# Patient Record
Sex: MALE | HISPANIC OR LATINO | URBAN - METROPOLITAN AREA
[De-identification: names, ages, dates, MRNs, and addresses within clinical notes are randomized per-mention and may not be internally consistent; named-entity substitution may affect disease eponyms.]

---

## 2017-07-26 ENCOUNTER — APPOINTMENT (RX ONLY)
Dept: URBAN - METROPOLITAN AREA CLINIC 288 | Facility: CLINIC | Age: 52
Setting detail: DERMATOLOGY
End: 2017-07-26

## 2017-07-26 DIAGNOSIS — D18.0 HEMANGIOMA: ICD-10-CM

## 2017-07-26 DIAGNOSIS — L81.4 OTHER MELANIN HYPERPIGMENTATION: ICD-10-CM

## 2017-07-26 DIAGNOSIS — L82.1 OTHER SEBORRHEIC KERATOSIS: ICD-10-CM

## 2017-07-26 DIAGNOSIS — B07.8 OTHER VIRAL WARTS: ICD-10-CM

## 2017-07-26 PROBLEM — D18.01 HEMANGIOMA OF SKIN AND SUBCUTANEOUS TISSUE: Status: ACTIVE | Noted: 2017-07-26

## 2017-07-26 PROBLEM — E78.5 HYPERLIPIDEMIA, UNSPECIFIED: Status: ACTIVE | Noted: 2017-07-26

## 2017-07-26 PROBLEM — I10 ESSENTIAL (PRIMARY) HYPERTENSION: Status: ACTIVE | Noted: 2017-07-26

## 2017-07-26 PROCEDURE — ? OTC SALICYLIC ACID COUNSELING

## 2017-07-26 PROCEDURE — 99203 OFFICE O/P NEW LOW 30 MIN: CPT | Mod: 25

## 2017-07-26 PROCEDURE — ? LIQUID NITROGEN

## 2017-07-26 PROCEDURE — ? COUNSELING

## 2017-07-26 PROCEDURE — 17110 DESTRUCTION B9 LES UP TO 14: CPT

## 2017-07-26 ASSESSMENT — LOCATION SIMPLE DESCRIPTION DERM
LOCATION SIMPLE: LOWER BACK
LOCATION SIMPLE: LEFT THIGH
LOCATION SIMPLE: ABDOMEN
LOCATION SIMPLE: INFERIOR FOREHEAD
LOCATION SIMPLE: LEFT UPPER ARM
LOCATION SIMPLE: RIGHT SHOULDER
LOCATION SIMPLE: RIGHT CHEEK
LOCATION SIMPLE: RIGHT SCALP
LOCATION SIMPLE: LEFT LOWER BACK
LOCATION SIMPLE: LEFT SHOULDER
LOCATION SIMPLE: RIGHT UPPER ARM
LOCATION SIMPLE: SCALP

## 2017-07-26 ASSESSMENT — LOCATION DETAILED DESCRIPTION DERM
LOCATION DETAILED: RIGHT POSTERIOR SHOULDER
LOCATION DETAILED: LEFT POSTERIOR SHOULDER
LOCATION DETAILED: EPIGASTRIC SKIN
LOCATION DETAILED: RIGHT PROXIMAL POSTERIOR UPPER ARM
LOCATION DETAILED: RIGHT MEDIAL FRONTAL SCALP
LOCATION DETAILED: RIGHT DISTAL POSTERIOR UPPER ARM
LOCATION DETAILED: LEFT INFERIOR MEDIAL MIDBACK
LOCATION DETAILED: SUPERIOR LUMBAR SPINE
LOCATION DETAILED: INFERIOR MID FOREHEAD
LOCATION DETAILED: RIGHT SUPERIOR PARIETAL SCALP
LOCATION DETAILED: LEFT PROXIMAL POSTERIOR UPPER ARM
LOCATION DETAILED: LEFT ANTERIOR PROXIMAL THIGH
LOCATION DETAILED: RIGHT SUPERIOR CENTRAL MALAR CHEEK
LOCATION DETAILED: LEFT DISTAL POSTERIOR UPPER ARM
LOCATION DETAILED: PERIUMBILICAL SKIN

## 2017-07-26 ASSESSMENT — LOCATION ZONE DERM
LOCATION ZONE: LEG
LOCATION ZONE: SCALP
LOCATION ZONE: TRUNK
LOCATION ZONE: ARM
LOCATION ZONE: FACE

## 2017-07-26 NOTE — PROCEDURE: LIQUID NITROGEN
Medical Necessity Information: It is in your best interest to select a reason for this procedure from the list below. All of these items fulfill various CMS LCD requirements except the new and changing color options.
Include Z78.9 (Other Specified Conditions Influencing Health Status) As An Associated Diagnosis?: No
Number Of Freeze-Thaw Cycles: 1 freeze-thaw cycle
Medical Necessity Clause: This procedure was medically necessary because the lesions that were treated were:
Consent: The patient's consent was obtained including but not limited to risks of crusting, scabbing, blistering, scarring, darker or lighter pigmentary change, recurrence, incomplete removal and infection.
Detail Level: Detailed
Render Post-Care Instructions In Note?: yes
Post-Care Instructions: I reviewed with the patient in detail post-care instructions. Patient is to wear sunprotection, and avoid picking at any of the treated lesions. Pt may apply Vaseline to crusted or scabbing areas.

## 2017-07-26 NOTE — PROCEDURE: MIPS QUALITY
Quality 431: Preventive Care And Screening: Unhealthy Alcohol Use - Screening: Patient screened for unhealthy alcohol use using a single question and scores less than 2 times per year
Quality 130: Documentation Of Current Medications In The Medical Record: Current Medications Documented
Quality 128: Preventive Care And Screening: Body Mass Index (Bmi) Screening And Follow-Up Plan: BMI is documented within normal parameters and no follow-up plan is required.
Detail Level: Detailed
Quality 226: Preventive Care And Screening: Tobacco Use: Screening And Cessation Intervention: Patient screened for tobacco and never smoked

## 2019-06-10 ENCOUNTER — APPOINTMENT (RX ONLY)
Dept: URBAN - METROPOLITAN AREA CLINIC 304 | Facility: CLINIC | Age: 54
Setting detail: DERMATOLOGY
End: 2019-06-10

## 2019-06-10 DIAGNOSIS — L82.0 INFLAMED SEBORRHEIC KERATOSIS: ICD-10-CM

## 2019-06-10 DIAGNOSIS — B07.8 OTHER VIRAL WARTS: ICD-10-CM

## 2019-06-10 PROBLEM — E78.5 HYPERLIPIDEMIA, UNSPECIFIED: Status: ACTIVE | Noted: 2019-06-10

## 2019-06-10 PROBLEM — I10 ESSENTIAL (PRIMARY) HYPERTENSION: Status: ACTIVE | Noted: 2019-06-10

## 2019-06-10 PROBLEM — L70.0 ACNE VULGARIS: Status: ACTIVE | Noted: 2019-06-10

## 2019-06-10 PROCEDURE — ? LIQUID NITROGEN

## 2019-06-10 PROCEDURE — ? BENIGN DESTRUCTION

## 2019-06-10 PROCEDURE — 17110 DESTRUCTION B9 LES UP TO 14: CPT

## 2019-06-10 ASSESSMENT — LOCATION DETAILED DESCRIPTION DERM
LOCATION DETAILED: LEFT SUPERIOR CENTRAL MALAR CHEEK
LOCATION DETAILED: RIGHT SUPERIOR CENTRAL MALAR CHEEK
LOCATION DETAILED: SUPERIOR MID FOREHEAD

## 2019-06-10 ASSESSMENT — LOCATION SIMPLE DESCRIPTION DERM
LOCATION SIMPLE: RIGHT CHEEK
LOCATION SIMPLE: LEFT CHEEK
LOCATION SIMPLE: SUPERIOR FOREHEAD

## 2019-06-10 ASSESSMENT — LOCATION ZONE DERM: LOCATION ZONE: FACE

## 2019-06-10 NOTE — PROCEDURE: LIQUID NITROGEN
Consent: The patient's consent was obtained including but not limited to risks of crusting, scabbing, blistering, scarring, darker or lighter pigmentary change, recurrence, incomplete removal and infection.
Render Post-Care Instructions In Note?: no
Medical Necessity Information: It is in your best interest to select a reason for this procedure from the list below. All of these items fulfill various CMS LCD requirements except the new and changing color options.
Medical Necessity Clause: This procedure was medically necessary because the lesions that were treated were:
Detail Level: Detailed
Post-Care Instructions: I reviewed with the patient in detail post-care instructions. Patient is to wear sunprotection, and avoid picking at any of the treated lesions. Pt may apply Vaseline to crusted or scabbing areas.

## 2019-06-10 NOTE — PROCEDURE: BENIGN DESTRUCTION
Medical Necessity Information: It is in your best interest to select a reason for this procedure from the list below. All of these items fulfill various CMS LCD requirements except the new and changing color options.
Medical Necessity Clause: This procedure was medically necessary because the lesions that were treated were:
Detail Level: Detailed
Render Note In Bullet Format When Appropriate: No
Treatment Number (Will Not Render If 0): 0
Anesthesia Volume In Cc: 0.5
Consent: The patient's consent was obtained including but not limited to risks of crusting, scabbing, blistering, scarring, darker or lighter pigmentary change, recurrence, incomplete removal and infection.
Post-Care Instructions: I reviewed with the patient in detail post-care instructions. Patient is to wear sunprotection, and avoid picking at any of the treated lesions. Pt may apply Vaseline to crusted or scabbing areas.

## 2023-08-22 ENCOUNTER — OFFICE VISIT (OUTPATIENT)
Dept: INTERNAL MEDICINE | Facility: CLINIC | Age: 58
End: 2023-08-22
Payer: COMMERCIAL

## 2023-08-22 VITALS
SYSTOLIC BLOOD PRESSURE: 128 MMHG | TEMPERATURE: 96.8 F | OXYGEN SATURATION: 97 % | DIASTOLIC BLOOD PRESSURE: 94 MMHG | WEIGHT: 190.4 LBS | HEART RATE: 54 BPM

## 2023-08-22 DIAGNOSIS — Z12.5 SCREENING PSA (PROSTATE SPECIFIC ANTIGEN): ICD-10-CM

## 2023-08-22 DIAGNOSIS — Z00.00 ANNUAL PHYSICAL EXAM: ICD-10-CM

## 2023-08-22 DIAGNOSIS — Z23 NEED FOR TDAP VACCINATION: ICD-10-CM

## 2023-08-22 DIAGNOSIS — I10 PRIMARY HYPERTENSION: Primary | ICD-10-CM

## 2023-08-22 DIAGNOSIS — E78.5 HYPERLIPIDEMIA, UNSPECIFIED HYPERLIPIDEMIA TYPE: ICD-10-CM

## 2023-08-22 PROCEDURE — 99204 OFFICE O/P NEW MOD 45 MIN: CPT | Performed by: PHYSICIAN ASSISTANT

## 2023-08-22 RX ORDER — LISINOPRIL 10 MG/1
10 TABLET ORAL DAILY
COMMUNITY
End: 2023-08-22

## 2023-08-22 RX ORDER — LISINOPRIL 20 MG/1
20 TABLET ORAL DAILY
Qty: 90 TABLET | Refills: 1 | Status: SHIPPED | OUTPATIENT
Start: 2023-08-22

## 2023-08-22 RX ORDER — ATORVASTATIN CALCIUM 20 MG/1
40 TABLET, FILM COATED ORAL
COMMUNITY
Start: 2020-08-15

## 2023-08-22 NOTE — PROGRESS NOTES
[facility]  [address]  [place] [address]  Dept: [phone]  Dept Fax: [phone]  Loc: [phone]  Loc Fax: [phone]    Jai Lopez  1965    New Patient Office Note    History of Present Illness:  Patient is a 58-year-old male in today to establish care for high blood pressure and hyperlipidemia.  On Lipitor 20 mg daily for high cholesterol.  Taking as directed with any problems or side effects.  Needing FLP rechecked.    Patient on lisinopril 10 mg daily as directed with any problems or side effects from blood pressure.  Patient reports blood pressure higher at home.  Denies any chest pain or headache.    Also needing annual physical.      The following portions of the patient's history were reviewed and updated as appropriate: allergies, current medications, past family history, past medical history, past social history, past surgical history, and problem list.    Medications:    Current Outpatient Medications:     atorvastatin (LIPITOR) 20 MG tablet, 2 tablets., Disp: , Rfl:     lisinopril (PRINIVIL,ZESTRIL) 20 MG tablet, Take 1 tablet by mouth Daily., Disp: 90 tablet, Rfl: 1    Subjective  No Known Allergies     Past Medical History:   Diagnosis Date    High cholesterol     Hypertension        Past Surgical History:   Procedure Laterality Date    SHOULDER SURGERY  2000       Family History   Problem Relation Age of Onset    Hyperlipidemia Mother     Hypertension Mother     Hyperlipidemia Brother     Hypertension Brother         Social History     Socioeconomic History    Marital status:    Tobacco Use    Smoking status: Never    Smokeless tobacco: Never   Vaping Use    Vaping Use: Never used   Substance and Sexual Activity    Alcohol use: Yes     Alcohol/week: 4.0 standard drinks     Types: 4 Drinks containing 0.5 oz of alcohol per week    Drug use: Never    Sexual activity: Defer       Review of Systems   Constitutional:   Negative for activity change, chills, fatigue, fever and unexpected weight change.   HENT:  Negative for congestion, ear pain, postnasal drip, sinus pressure and sore throat.    Eyes:  Negative for pain, discharge and redness.   Respiratory:  Negative for cough, shortness of breath and wheezing.    Cardiovascular:  Negative for chest pain, palpitations and leg swelling.   Gastrointestinal:  Negative for diarrhea, nausea and vomiting.   Endocrine: Negative for cold intolerance and heat intolerance.   Genitourinary:  Negative for decreased urine volume and dysuria.   Musculoskeletal:  Negative for arthralgias and myalgias.   Skin:  Negative for rash and wound.   Neurological:  Negative for dizziness, light-headedness and headaches.   Hematological:  Does not bruise/bleed easily.   Psychiatric/Behavioral:  Negative for confusion, dysphoric mood and sleep disturbance. The patient is not nervous/anxious.      Objective  Vitals:    08/22/23 1029   BP: 128/94   BP Location: Left arm   Patient Position: Sitting   Cuff Size: Large Adult   Pulse: 54   Temp: 96.8 øF (36 øC)   TempSrc: Temporal   SpO2: 97%   Weight: 86.4 kg (190 lb 6.4 oz)       Physical Exam  Physical Exam  Vitals and nursing note reviewed.   Constitutional:       General: He is not in acute distress.     Appearance: He is not ill-appearing.   HENT:      Head: Normocephalic.      Right Ear: Tympanic membrane, ear canal and external ear normal. There is no impacted cerumen.      Left Ear: Tympanic membrane, ear canal and external ear normal. There is no impacted cerumen.      Nose: No congestion or rhinorrhea.      Mouth/Throat:      Mouth: Mucous membranes are moist.      Pharynx: Oropharynx is clear. No oropharyngeal exudate or posterior oropharyngeal erythema.   Eyes:      General:         Right eye: No discharge.         Left eye: No discharge.      Extraocular Movements: Extraocular movements intact.      Conjunctiva/sclera: Conjunctivae normal.       Pupils: Pupils are equal, round, and reactive to light.   Cardiovascular:      Rate and Rhythm: Normal rate and regular rhythm.      Heart sounds: Normal heart sounds. No murmur heard.    No friction rub. No gallop.   Pulmonary:      Effort: Pulmonary effort is normal. No respiratory distress.      Breath sounds: Normal breath sounds. No wheezing.   Abdominal:      General: Bowel sounds are normal. There is no distension.      Palpations: Abdomen is soft. There is no mass.      Tenderness: There is no abdominal tenderness.   Musculoskeletal:         General: No swelling. Normal range of motion.      Cervical back: Normal range of motion. No tenderness.      Right lower leg: No edema.      Left lower leg: No edema.   Lymphadenopathy:      Cervical: No cervical adenopathy.   Skin:     Findings: No bruising, erythema or rash.   Neurological:      Mental Status: He is oriented to person, place, and time.      Gait: Gait normal.   Psychiatric:         Mood and Affect: Mood normal.         Behavior: Behavior normal.         Thought Content: Thought content normal.         Judgment: Judgment normal.       Diagnostic Data  Procedures    Assessment  Diagnoses and all orders for this visit:    1. Primary hypertension (Primary)    2. Hyperlipidemia, unspecified hyperlipidemia type    3. Need for Tdap vaccination    4. Annual physical exam  -     CBC (No Diff)  -     Comprehensive Metabolic Panel  -     TSH Rfx On Abnormal To Free T4  -     Hepatitis C Antibody  -     Lipid Panel  -     Hemoglobin A1c; Future    5. Screening PSA (prostate specific antigen)  -     PSA SCREENING; Future    Other orders  -     lisinopril (PRINIVIL,ZESTRIL) 20 MG tablet; Take 1 tablet by mouth Daily.  Dispense: 90 tablet; Refill: 1        Plan    1. Primary hypertension (Primary)- uncontrolled on lisinopril 10 mg daily.  Increase to 20 mg daily. Advised patient to take blood pressure readings at home 2-3 times daily. Advised if blood pressure  higher than 160/100 or lower than 100/60 to call the office immediately. If symptomatic, go to the ER. Patient verbalized understanding of all instructions given and complied.    2. Hyperlipidemia, unspecified hyperlipidemia type- on atorvastatin 20 mg nightly.  Repeat FLP.    3. Need for Tdap vaccination- will get follow-up.    4. Annual physical exam- obtain fasting labs and follow-up with these.  Advised on nutrition and exercise and follow-up with this.    5. Screening PSA (prostate specific antigen)- ordered PSA.      Return in about 2 weeks (around 9/5/2023) for Recheck.    Lucien Mathews PA-C  08/22/2023

## 2023-08-23 ENCOUNTER — LAB (OUTPATIENT)
Dept: INTERNAL MEDICINE | Facility: CLINIC | Age: 58
End: 2023-08-23
Payer: COMMERCIAL

## 2023-08-23 DIAGNOSIS — Z00.00 ANNUAL PHYSICAL EXAM: ICD-10-CM

## 2023-08-23 DIAGNOSIS — Z12.5 SCREENING PSA (PROSTATE SPECIFIC ANTIGEN): ICD-10-CM

## 2023-08-23 LAB
ALBUMIN SERPL-MCNC: 4.3 G/DL (ref 3.5–5.2)
ALBUMIN/GLOB SERPL: 1.5 G/DL
ALP SERPL-CCNC: 56 U/L (ref 39–117)
ALT SERPL W P-5'-P-CCNC: 29 U/L (ref 1–41)
ANION GAP SERPL CALCULATED.3IONS-SCNC: 12.4 MMOL/L (ref 5–15)
AST SERPL-CCNC: 27 U/L (ref 1–40)
BILIRUB SERPL-MCNC: 0.4 MG/DL (ref 0–1.2)
BUN SERPL-MCNC: 14 MG/DL (ref 6–20)
BUN/CREAT SERPL: 13.3 (ref 7–25)
CALCIUM SPEC-SCNC: 9.7 MG/DL (ref 8.6–10.5)
CHLORIDE SERPL-SCNC: 105 MMOL/L (ref 98–107)
CHOLEST SERPL-MCNC: 134 MG/DL (ref 0–200)
CO2 SERPL-SCNC: 21.6 MMOL/L (ref 22–29)
CREAT SERPL-MCNC: 1.05 MG/DL (ref 0.76–1.27)
DEPRECATED RDW RBC AUTO: 42.6 FL (ref 37–54)
EGFRCR SERPLBLD CKD-EPI 2021: 82.8 ML/MIN/1.73
ERYTHROCYTE [DISTWIDTH] IN BLOOD BY AUTOMATED COUNT: 14.3 % (ref 12.3–15.4)
GLOBULIN UR ELPH-MCNC: 2.8 GM/DL
GLUCOSE SERPL-MCNC: 96 MG/DL (ref 65–99)
HBA1C MFR BLD: 6.3 % (ref 4.8–5.6)
HCT VFR BLD AUTO: 44.8 % (ref 37.5–51)
HCV AB SER DONR QL: NORMAL
HDLC SERPL-MCNC: 34 MG/DL (ref 40–60)
HGB BLD-MCNC: 15.3 G/DL (ref 13–17.7)
LDLC SERPL CALC-MCNC: 69 MG/DL (ref 0–100)
LDLC/HDLC SERPL: 1.87 {RATIO}
MCH RBC QN AUTO: 28.8 PG (ref 26.6–33)
MCHC RBC AUTO-ENTMCNC: 34.2 G/DL (ref 31.5–35.7)
MCV RBC AUTO: 84.2 FL (ref 79–97)
PLATELET # BLD AUTO: 206 10*3/MM3 (ref 140–450)
PMV BLD AUTO: 10.7 FL (ref 6–12)
POTASSIUM SERPL-SCNC: 4.3 MMOL/L (ref 3.5–5.2)
PROT SERPL-MCNC: 7.1 G/DL (ref 6–8.5)
PSA SERPL-MCNC: 1.98 NG/ML (ref 0–4)
RBC # BLD AUTO: 5.32 10*6/MM3 (ref 4.14–5.8)
SODIUM SERPL-SCNC: 139 MMOL/L (ref 136–145)
TRIGL SERPL-MCNC: 182 MG/DL (ref 0–150)
TSH SERPL DL<=0.05 MIU/L-ACNC: 2.27 UIU/ML (ref 0.27–4.2)
VLDLC SERPL-MCNC: 31 MG/DL (ref 5–40)
WBC NRBC COR # BLD: 6.01 10*3/MM3 (ref 3.4–10.8)

## 2023-08-23 PROCEDURE — 84443 ASSAY THYROID STIM HORMONE: CPT | Performed by: PHYSICIAN ASSISTANT

## 2023-08-23 PROCEDURE — 80053 COMPREHEN METABOLIC PANEL: CPT | Performed by: PHYSICIAN ASSISTANT

## 2023-08-23 PROCEDURE — 36415 COLL VENOUS BLD VENIPUNCTURE: CPT | Performed by: PHYSICIAN ASSISTANT

## 2023-08-23 PROCEDURE — 86803 HEPATITIS C AB TEST: CPT | Performed by: PHYSICIAN ASSISTANT

## 2023-08-23 PROCEDURE — 83036 HEMOGLOBIN GLYCOSYLATED A1C: CPT | Performed by: PHYSICIAN ASSISTANT

## 2023-08-23 PROCEDURE — 85027 COMPLETE CBC AUTOMATED: CPT | Performed by: PHYSICIAN ASSISTANT

## 2023-08-23 PROCEDURE — G0103 PSA SCREENING: HCPCS | Performed by: PHYSICIAN ASSISTANT

## 2023-08-23 PROCEDURE — 80061 LIPID PANEL: CPT | Performed by: PHYSICIAN ASSISTANT

## 2023-09-02 ENCOUNTER — E-VISIT (OUTPATIENT)
Dept: FAMILY MEDICINE CLINIC | Facility: TELEHEALTH | Age: 58
End: 2023-09-02

## 2023-09-02 NOTE — E-VISIT TREATED
Chief Complaint: Rashes and other skin conditions   Patient introduction   Patient is 58-year-old male presenting with nonpainful, nonpruritic rash on their legs for 24 to 48 hours.   General presentation   Patient has not had any recent cold symptoms. No fever, chills, myalgia, nausea, vomiting, diarrhea, headache, or fatigue/lethargy.   Has not tried any treatments for current rash symptoms.   Patient has no previous history of a similar rash.   Eczema: Patient has history and family history of atopic conditions.   Insect bites: No known recent insect exposure.   Chickenpox: Has had chickenpox.   Scabies: No recent scabies exposure.   Impetigo: No recent impetigo exposure.   Ticks: Patient has not recently spent significant time outdoors. In previous month, patient has not spent any time in regions with a high risk of tick-borne disease.   Appearance or location of rash does not change throughout the course of a day.   Rash has not spread to a new location.   No herald patch prior to onset of rash.   Rash is not in an area that is regularly shaved. Patient does not participate in contact sports. Patient does not work in a school or childcare facility.   No history of prior MRSA infection.   No known aggravators.   Review of red flags/alarm symptoms:    No systemic symptoms    No symptoms of anaphylactic reaction    No swollen lymph nodes    No recent history suggesting adverse drug rash (no new medication, herb, or supplement)   Current medications   Currently taking lisinopril 20 MG tablet and atorvastatin 20 MG tablet.   Medication allergies   None.   Medication contraindication review   Patient is currently taking an ACE inhibitor. Therefore, medications containing trimethoprim will not be prescribed.   No cerebral malaria, CHF, cutaneous wfllv-oauteo-fefo disease, folate deficiency, G6PD deficiency (or breastfeeding a child with G6PD deficiency), generalized erythroderma, liver disease, lamellar ichthyosis,  malignancy or premalignancy at the affected site, megaloblastic anemia, mononucleosis, Netherton syndrome, peripheral neuropathy, porphyria, QT prolongation, congenital long QT syndrome, skin barrier defect condition, systemic mycoses, TMP/SMX-associated thrombocytopenia, thyroid dysfunction, or ulcerative colitis.   No history of myasthenia gravis, aortic aneurysm or dissection, Marfan syndrome, or Caden-Danlos syndrome.   Past medical history   Immune conditions: No immunocompromising conditions.   No history of cancer.   Patient-submitted comments   Patient was asked if they had anything to add about their symptoms. Patient writes: Dots showed somewhere in last two days. Just moved to [place] a month ago. Lots of mosquitoes here. Also just doubled linisopril from10 to 20 mg. .   Patient did not request an excuse note.   Assessment   Atopic dermatitis (Eczema).   This is the likely diagnosis based on interview responses and patient-submitted photos, including:    Symptom profile    Personal history of atopic conditions    Family history of atopic conditions   Plan   Medications:    triamcinolone acetonide 0.5 % topical cream RX 0.5% apply thin film on affected area bid 14d Use for up to 14 days. Discontinue sooner if symptoms clear up completely. Amount is 15 g.   The patient's prescription will be sent to:   [id number]   [address] [facility] [id number]   Phone: [phone]     Fax: [phone]   Education:    Condition and causes    Prevention    Treatment and self-care    When to call provider   ----------   Electronically signed by TIMOTHY Hurt on 2023-09-02 at 18:08PM   ----------   Patient Interview Transcript:   Where is the affected area located? Select all that apply.    Leg   Not selected:    Scalp    Face    Inside mouth or on lips    Neck    Arm    Hand    Chest    Stomach    Back    Buttocks    Groin    Foot or in between toes    None of the above   Which leg is bothering  you? Select one.    Both   Not selected:    Right    Left   Before your skin symptoms appeared, were you exposed to any of these possible triggers? Select all that apply.    None of the above   Not selected:    Tick bite    Other insect bite or sting in the affected area    Dog or cat bite    Cut, scrape, or other skin injury in the affected area    Contact with poison oak, poison ivy, or poison sumac in the affected area    Contact with a new soap, perfume, skincare product, cleaning product, or other chemical in the affected area    Wearing new jewelry, belt buckle, or other metal accessory in the affected area    Taking a new medication, supplement, or herb    Consuming a new food or drink    Vaccine injection    Exposure to extreme hot or cold temperatures    Exercising intensely    Sitting in a hot tub or swimming in a heated swimming pool    Wearing ill-fitting shoes or socks for a long time    Contact with someone who has a similar rash    Contact with someone who has impetigo    Contact with someone who has scabies    Other (specify)   Have you ever had chickenpox? Select one.    Yes   Not selected:    No    I'm not sure   Is the affected skin in an area that you shave regularly? Select one.    No   Not selected:    Yes   Does the appearance or location of your skin symptoms change throughout the course of a day? For example, does it appear on one part of your body in the morning, disappear completely after several hours, and then reappear on a different part of your body? Select one.    No   Not selected:    Yes   Since your skin symptoms first appeared, have they spread or shown up in new locations? This includes covering a larger area than they first did, or spreading to another part of the body. Select one.    No   Not selected:    Yes   Which of these describe the affected area? Select all that apply.    None of the above   Not selected:    Itchy    Painful    Warmer than other areas of my skin   How  long have you had these current skin symptoms? Select one.    24 to 48 hours   Not selected:    Less than 24 hours    2 to 3 days    3 to 5 days    5 to 7 days    1 to 2 weeks    More than 2 weeks   Do any of these make your symptoms worse? Select all that apply.    None of the above   Not selected:    Alcohol    Exercise    Exposure to very hot or very cold temperature    Certain foods    Changes in weather    , soaps, or detergents    Hot beverages    Spicy foods    Stress or strong emotions (such as anger or embarrassment)    Sun exposure    Sweat    Wool in clothing or blankets   To recommend the best treatment for you, we need to see photos of the affected area.   [image: /[email or web address]-[email or web address]]   Close-up detail (for size, shape, and color)   [image: /[email or web address]-rash-location.[email or web address]]   Surrounding area (to compare with healthy skin)   [image: /[email or web address]-rash-distance.png]   Affected area at a distance (for scale and location)   If you choose not to send photos, you'll need to speak with a provider to get care.    Select one.    OK, I'll send photos.   Not selected:    I'd rather not send photos. Show me my care options.   Send at least 3 photos for review - Don't use a flash. - Make sure the photos are in focus. - Take a close-up photo (for size, shape, color). - Take a photo showing surrounding area (to compare with healthy skin). - Take a photo with a quarter coin or ruler near the affected area to show scale. - If more than one location is affected, repeat for each location.    Upload 1    Upload 2    Upload 3   Not selected:    Upload 4    Upload 5   A rash can be a sign of a more serious condition. These conditions may need in-person care for an exam or lab tests. Along with your skin symptoms, have you had any of these symptoms? Select all that apply.    None of these   Not selected:    Fever    Chills    Body aches or muscle aches    Nausea    Vomiting    Diarrhea     Headache    Fatigue, exhaustion, or lethargy (feeling drowsy, dull, or low energy)   Have you had swollen lymph nodes? Swollen lymph nodes are small lumps under the skin that are soft, tender, and often painful. They may be noticed in the neck, the armpits, or the groin. Select one.    No, not that I've noticed   Not selected:    Yes   Along with your skin symptoms, have you had any of these symptoms? Select all that apply.    None of these   Not selected:    Chest pains or rapid heartbeat    Shortness of breath or wheezing    Swelling of lips or tongue    Throat tightness or hoarse voice    Stomach cramps, diarrhea, or vomiting    Confusion or dizziness   Have you recently had any cold symptoms (runny nose, nasal congestion, cough, or sore throat)? Select one.    No   Not selected:    Yes   Before the rash appeared, did you see a large, round patch on your chest, stomach, or back? This patch is usually 1 to 4 inches across, dry, and itchy. It often appears a few days to a few weeks before the rash. Select one.    No   Not selected:    Yes   Were you recently exposed to any insects? For example: - Do you have any household pets that may have fleas? - Have you noticed an increase in spiders, either indoors or outdoors? - Have you slept where bedbugs may be present? - Have you hiked, camped, or gardened where mosquitoes may have been present?    No, not that I know of   Not selected:    Yes   In the last month, did you spend a lot of time outdoors, especially in wooded areas with brushy fields or tall grasses? Select one.    No   Not selected:    Yes   In the last month, have you been to any of these states? Scroll to see all options. Select all that apply.    No   Not selected:    [facility]    [facility]    [facility]   Have you had  these skin symptoms before? Select one.    No   Not selected:    At least once before    Many times before    I'm not sure   Do you or does anyone in your family have a history of allergies (hay fever, seasonal allergies), eczema, or asthma? Select all that apply.    Yes, I do    Yes, a family member does   Not selected:    No, not that I know of   Have you ever been treated for a MRSA (methicillin-resistant Staphylococcus aureus) infection? MRSA is a type of bacteria that's resistant to many commonly used antibiotics. Select one.    No, not that I know of   Not selected:    Yes   Do you have any of these conditions? Scroll to see all options. Select all that apply.    None of the above   Not selected:    Cerebral malaria    Congenital long QT syndrome    Folate deficiency    Systemic fungal infection, such as invasive candidiasis    G6PD deficiency, or breastfeeding a child with G6PD deficiency    Infection at the affected area    Megaloblastic anemia    Mono (mononucleosis)    Decreased sensation in feet (peripheral neuropathy)    Porphyria    Skin cancer or pre-malignancy at the affected area    A serious skin condition (skin barrier defect condition, Netherton syndrome, lamellar ichthyosis, generalized erythroderma, or cutaneous ezjrj-mdocib-bxwv disease)    QT prolongation    Thyroid dysfunction    Ulcerative colitis   Do you have any of these conditions that can affect the immune system? Scroll to see all options. Select all that apply.    None of these   Not selected:    History of bone marrow transplant    Chronic kidney disease    Chronic liver disease (including cirrhosis)    HIV/AIDS    Inflammatory bowel disease (Crohn's disease or ulcerative colitis)    Lupus    Moderate to severe plaque psoriasis    Multiple sclerosis    Rheumatoid arthritis    Sickle cell anemia    Alpha or beta thalassemia    History of solid organ transplant (kidney, liver, or heart)    History of spleen removal    An autoimmune  disorder not listed here (specify)    A condition requiring treatment with long-term use of oral steroids (such as prednisone, prednisolone, or dexamethasone) (specify)   Have you ever been diagnosed with cancer? Select one.    No   Not selected:    Yes, I have cancer now    Yes, but I'm in remission   Do you have any of these conditions? Scroll to see all options. Select all that apply.    None of the above   Not selected:    Myasthenia gravis    History of aortic aneurysm or dissection    Marfan syndrome    Caden-Danlos syndrome   Are you currently being treated for type 1 or type 2 diabetes? Select one.    No   Not selected:    Yes   Do you play sports or take part in activities with skin-to-skin contact? Select one.    No   Not selected:    Yes   Do you work in a school or childcare center? Select one.    No   Not selected:    Yes   Have you tried any treatments for your current symptoms? Select one.    No   Not selected:    Yes   Are you taking any of these medications? Select all that apply.    An ACE inhibitor, such as lisinopril, enalapril, captopril, or benazepril   Not selected:    An angiotensin II receptor blocker (ARB), such as candesartan, irbesartan, losartan, or valsartan    Kynmobi or Apokyn (apomorphine)    No   Are you still taking these medications listed in your medical record? If you're not taking any of these, click Next. Select all that apply.    lisinopril 20 MG tablet    atorvastatin 20 MG tablet   Are you taking any other medications, vitamins, or supplements? Select one.    No   Not selected:    Yes   Have you ever had an allergic or bad reaction to any medication? Select one.    No   Not selected:    Yes   Have you ever had jaundice or liver problems as a result of taking amoxicillin-clavulanate (Augmentin)? Jaundice is a condition in which the skin and the whites of the eyes turn yellow. Select all that apply.    No, not that I know of   Not selected:    Yes, jaundice    Yes, liver  problems   Do you need a doctor's note? A doctor's note confirms that you received care today and states when you can return to school or work. It does not contain information about your diagnosis or treatment plan. Your provider will make the final decision on whether to give you a doctor's note. Doctor's notes cannot be backdated. Select one.    No   Not selected:    Today only (1 day)    Today and tomorrow (2 days)    3 days   Is there anything you'd like to add about your symptoms? Please limit your comments to the symptoms asked about in this interview. If you include comments about other concerns, your provider may recommend that you be seen in person.    Dots showed somewhere in last two days. Just moved to [place] a month ago. Lots of mosquitoes here. Also just doubled linisopril from10 to 20 mg.   ----------   Medical history   Medical history data does not currently exist for this patient.

## 2023-09-02 NOTE — EXTERNAL PATIENT INSTRUCTIONS
[image:  data:image/svg+xml;base64,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 HBNcBL78RIPeEIS6GyD8GzrpVLLjPlE3BBDsITBxVgPnSZBsLrG4Cn67N9OcsXo+GboeQGcuDZMaFNffQOSzi8BuNxPzdWFose9kw5Itz1MsNFJ0oy0qjQDeb5AasYkurKEgWQO7Rv7yXU79SEK1DPDoJqDwBhweAF55JOCoFQJcYkEgMA3aLBLtGWSrLi59P1KdfAd+AnpaJYfsCZZwLUjfEYFgTHbzXmAduOQpjp2ow8Zgj3MkHTK8fz1maBfvULfykXBoXKY5wh8bVJ47pFGgUKBlMW3iAIEfFBK5LhRtRQGzSSZkWP2rYnDaXU96SSThQUMxGFBuXw16TDI5AIN9VTI1PkPxDUdqMwRmSNFcRL4mCcJyQB37LJCvZeT7KN95SUZbQZD7AhOxZLOtAfP9Kh95LpErOK12DggwUY6fPYYxRyskXF90HfDpFxnaYAwwGQUzTx38OYR5FMUbDKL0nzR+GA1sSBDqYdb0T4L1Tg0=]   Diagnosis   Eczema   My name is TIMOTHY Hurt, and I'm a healthcare provider at [facility]. After reviewing your responses and photos, I see that you have eczema. This is a common condition that affects millions of people every year.   [image:  data:image/svg+xml;base64,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]   Medications   Your pharmacy   [id number] [facility] [phone]     Prescription   Triamcinolone  acetonide topical cream (0.5%): Apply thin film on affected area twice a day for up to 14 days. Discontinue sooner if symptoms clear up completely.   [image: data:image/svg+xml;base64,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]   About your diagnosis   Eczema, also known as atopic dermatitis, is a condition that causes dry, itchy, scaly skin. In people with light skin, areas with eczema might look red or pink. In people with dark skin, affected areas can appear  dark brown, purple, or gray. Sometimes the affected patch of skin looks lighter than the skin around it.   The dry and itchy skin of eczema gets worse with scratching. Scratching can lead to swelling, small bumps, and even oozing blisters that eventually crust over and turn scaly. Environmental irritants and allergens can trigger eczema. Pollen, mold, animal dander, and certain foods are all common triggers.   Eczema is most often seen in younger children, but adults can also get it. It tends to run in families, which seems to be true in your case. Eczema isn't contagious.   I looked at your interview responses carefully to diagnose your condition. You told me that:    The rash is located on the buttocks, arm, or leg. Eczema is often found in the creases of elbows, on the backs of knees, and on the buttocks.   [image:  data:image/svg+xml;base64,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]   What to  expect   Most patients have occasional flare-ups of their eczema. Your skin may stay healed for weeks, months, or even years before symptoms return. Although eczema can't be cured, the symptoms can be managed.   [image: data:image/svg+xml;base64,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]   When to seek care   Call us at [phone]   with any sudden or unexpected symptoms.    Your symptoms don't improve within 1 week of treatment.    Signs of a skin infection, which may include fever or the rash becoming painful, very red, or warm.    You develop painful, fluid-filled blisters.   If you have contact with someone who has a viral skin infection, such as herpes or cold sores, contact us to schedule an appointment as soon as possible.   [image:  data:image/svg+xml;base64,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]   Other treatment    Moisturize your skin often, especially within a few minutes after taking a shower or bath. This can help restore your skin's natural barrier and keep it healthy. Fragrance-free creams and ointments (Cerave Cream, Cetaphil Restoraderm, or Eucerin Eczema Relief body creme) are better than lotions.    Trim your fingernails and try to avoid scratching. Scratching can lead to a skin infection and scarring.    Wear loose cotton clothing to avoid further irritating your skin.   [image:  data:image/svg+xml;base64,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]   Prevention    Avoid hot or very long showers or baths.    Wash your skin with mild or gentle soaps.    Try to manage stress. Exercise regularly and set aside time to relax.    Use a humidifier to prevent the air from becoming too dry and making your eczema worse.   [image:  data:[email or web address]+xml;base64,SZN4GgE8wHeseg0tkSZ0wArvN9m9cn94Sm9pweaoIjVjOT8bhtdjNPKbiuLod658IeOjPBSvxEQ4Gdnyw28gIXNwLSBhhtBkD3ApZUp9FpGnAVe2NzNuYJcsJLEsNUJ9ORSnjUMoN0v8CIU5GYGegkjkb3SjhG5jKQJbDSG7TFA7DnT4xNs3c7MsD5S4WjNoAERjwnVbRqMlcjzvBYrzMCEisr6vnNV6FPQ+MqMpABShmFouzMR0EeGyzsLiIWCzWMReQYJfKW4idwxbJEQrHCAcwQtct7gdpYGpy7Xua6tiVJKoPU9aRnIeXZDWNRIoDTmWEEWdFUAuJeFxKMPkGQ15TJNmPDNfPlMeWTIzFuVkCyktUTG6SXLzWMReMq5yHWSkBODhScglJUB3hpNaYqP1BTG0SkFpWDGtRhJ4ZPR1VvH0ZTkjHyrtbMH4nK6GQhFnULpyqqWcuWH6HuqpfjBxJoHcgZFxvb9ik2Aep2EuBML2jy7wgZPqr3WioJnaqQAlKShkDSMgCwEijZG3LsQnAzX4Aj0xPATvHHopHSB7LVI+FJ4bmW0gIvsZTUA3sVdrMFTwJO7olGsvLYFjOKThDPPbUZXbUG1knresESSmNJU6rm2dOL26qSVeYCIwfRZ2KhEnRiR7RP2gZkVgMNnbQPJ7XZJjzVQ2SdG7Un96L7rxkqF+NmscHYvnxU4kOJxmRBTxcF9tNTArW8vef0V2EaJoBYM0Mn2fnkZyd0Maa8kuPVTiBM7bJcU9XK2uOyLrETlpRZOgECZfvXH5MjVlAiT8Cj2jCvopIkxvkRhtIQ5VEbEnXSBltNobqHN0BlHtSPtyLN5dVDAwPGWzHUNwFR8bqfruQDSsNYIuaYevi9ytkQQzf8Glg2yyAQDwRC3hEhQrTSVDGvTtQnAcKt31bR19YbWcSSBeWlYxDYQfFFMdBeMvPy38WMPjWNN0TxShXVLVAhTNKhWxUwJaWiAlYJ26RXAxUyNyNzLsXoIsVr79kiD+BS9jEEIuIriAPKW7jQJ6mKYfSF7ryWDxIG4qHxMmU1cwa7Z2EyJeDUF6Fy6cwlBjVwfyvZ76lDw0BR0udLZpf2NlkXscqLHaAQH5Nn4hXX8eQUE9YhEaXHJxPyJtAL81DPDrWU7qMWMxDQQfON8qNpFlTW1wXERQPFhyMeYsELWuHWWhMDViFX1wYNS5XYM0PwJxXlysOJvnQssurXL1bS9KHU5tviz+]   Your provider   Your diagnosis was provided by TIMOTHY Hurt, a member of your trusted care team at [facility].   If you have any questions, call us at [phone]  .

## 2023-09-08 ENCOUNTER — OFFICE VISIT (OUTPATIENT)
Dept: INTERNAL MEDICINE | Facility: CLINIC | Age: 58
End: 2023-09-08
Payer: COMMERCIAL

## 2023-09-08 VITALS
HEART RATE: 61 BPM | HEIGHT: 67 IN | SYSTOLIC BLOOD PRESSURE: 100 MMHG | TEMPERATURE: 96.9 F | DIASTOLIC BLOOD PRESSURE: 70 MMHG | BODY MASS INDEX: 30.07 KG/M2 | OXYGEN SATURATION: 96 % | WEIGHT: 191.6 LBS

## 2023-09-08 DIAGNOSIS — E78.5 HYPERLIPIDEMIA, UNSPECIFIED HYPERLIPIDEMIA TYPE: ICD-10-CM

## 2023-09-08 DIAGNOSIS — R73.09 ELEVATED HEMOGLOBIN A1C: ICD-10-CM

## 2023-09-08 DIAGNOSIS — I10 PRIMARY HYPERTENSION: Primary | ICD-10-CM

## 2023-09-08 RX ORDER — LISINOPRIL 20 MG/1
20 TABLET ORAL DAILY
Qty: 90 TABLET | Refills: 1 | Status: SHIPPED | OUTPATIENT
Start: 2023-09-08

## 2023-09-08 NOTE — PROGRESS NOTES
[facility]  [address]  [facility] [address]  Dept: [phone]  Dept Fax: [phone]  Loc: [phone]  Loc Fax: [phone]    Jai Lopez  1965    Follow Up Office Visit Note    History of Present Illness:  Patient is a 58-year-old male in today to follow-up for hypertension, hyperlipidemia, and elevated A1c.  Recent labs reviewed with patient in office today.  Patient committed to diet and exercise to lower A1c and triglycerides.    On lisinopril 20 mg daily as directed for high blood pressure without any problems or side effects.  Blood pressure doing better.    Patient overall doing well and feeling well today.    Hypertension  Pertinent negatives include no chest pain, headaches, palpitations or shortness of breath.     The following portions of the patient's history were reviewed and updated as appropriate: allergies, current medications, past family history, past medical history, past social history, past surgical history, and problem list.    Medications:    Current Outpatient Medications:     atorvastatin (LIPITOR) 20 MG tablet, 2 tablets., Disp: , Rfl:     lisinopril (PRINIVIL,ZESTRIL) 20 MG tablet, Take 1 tablet by mouth Daily., Disp: 90 tablet, Rfl: 1    Subjective  No Known Allergies     Past Medical History:   Diagnosis Date    High cholesterol     Hypertension        Past Surgical History:   Procedure Laterality Date    SHOULDER SURGERY  2000       Family History   Problem Relation Age of Onset    Hyperlipidemia Mother     Hypertension Mother     Hyperlipidemia Brother     Hypertension Brother         Social History     Socioeconomic History    Marital status:    Tobacco Use    Smoking status: Never    Smokeless tobacco: Never   Vaping Use    Vaping Use: Never used   Substance and Sexual Activity    Alcohol use: Yes     Alcohol/week: 4.0 standard drinks     Types: 4 Drinks containing 0.5 oz of alcohol per week    Drug  "use: Never    Sexual activity: Defer       Review of Systems   Constitutional:  Negative for activity change, chills, fatigue, fever and unexpected weight change.   HENT:  Negative for congestion, ear pain, postnasal drip, sinus pressure and sore throat.    Eyes:  Negative for pain, discharge and redness.   Respiratory:  Negative for cough, shortness of breath and wheezing.    Cardiovascular:  Negative for chest pain, palpitations and leg swelling.   Gastrointestinal:  Negative for diarrhea, nausea and vomiting.   Endocrine: Negative for cold intolerance and heat intolerance.   Genitourinary:  Negative for decreased urine volume and dysuria.   Musculoskeletal:  Negative for arthralgias and myalgias.   Skin:  Negative for rash and wound.   Neurological:  Negative for dizziness, light-headedness and headaches.   Hematological:  Does not bruise/bleed easily.   Psychiatric/Behavioral:  Negative for confusion, dysphoric mood and sleep disturbance. The patient is not nervous/anxious.        Objective  Vitals:    09/08/23 0944   BP: 100/70   BP Location: Left arm   Patient Position: Sitting   Cuff Size: Large Adult   Pulse: 61   Temp: 96.9 °F (36.1 °C)   TempSrc: Temporal   SpO2: 96%   Weight: 86.9 kg (191 lb 9.6 oz)   Height: 170.2 cm (67\")     Body mass index is 30.01 kg/m².     Physical Exam  Physical Exam  Vitals and nursing note reviewed.   Constitutional:       General: He is not in acute distress.     Appearance: He is not ill-appearing.   HENT:      Head: Normocephalic.      Right Ear: Tympanic membrane, ear canal and external ear normal. There is no impacted cerumen.      Left Ear: Tympanic membrane, ear canal and external ear normal. There is no impacted cerumen.      Nose: No congestion or rhinorrhea.      Mouth/Throat:      Mouth: Mucous membranes are moist.      Pharynx: Oropharynx is clear. No oropharyngeal exudate or posterior oropharyngeal erythema.   Eyes:      General:         Right eye: No discharge.    "      Left eye: No discharge.      Extraocular Movements: Extraocular movements intact.      Conjunctiva/sclera: Conjunctivae normal.      Pupils: Pupils are equal, round, and reactive to light.   Cardiovascular:      Rate and Rhythm: Normal rate and regular rhythm.      Heart sounds: Normal heart sounds. No murmur heard.    No friction rub. No gallop.   Pulmonary:      Effort: Pulmonary effort is normal. No respiratory distress.      Breath sounds: Normal breath sounds. No wheezing.   Abdominal:      General: Bowel sounds are normal. There is no distension.      Palpations: Abdomen is soft. There is no mass.      Tenderness: There is no abdominal tenderness.   Musculoskeletal:         General: No swelling. Normal range of motion.      Cervical back: Normal range of motion. No tenderness.      Right lower leg: No edema.      Left lower leg: No edema.   Lymphadenopathy:      Cervical: No cervical adenopathy.   Skin:     Findings: No bruising, erythema or rash.   Neurological:      Mental Status: He is oriented to person, place, and time.      Gait: Gait normal.   Psychiatric:         Mood and Affect: Mood normal.         Behavior: Behavior normal.         Thought Content: Thought content normal.         Judgment: Judgment normal.       Diagnostic Data  Procedures    Assessment  Diagnoses and all orders for this visit:    1. Primary hypertension (Primary)    2. Hyperlipidemia, unspecified hyperlipidemia type    3. Elevated hemoglobin A1c    Other orders  -     lisinopril (PRINIVIL,ZESTRIL) 20 MG tablet; Take 1 tablet by mouth Daily.  Dispense: 90 tablet; Refill: 1        Plan    1. Primary hypertension (Primary)- well controlled on lisinopril 20 mg daily.  Keep same.  Refilled med.  Continue to monitor.    2. Hyperlipidemia, unspecified hyperlipidemia type- on Lipitor 20 mg nightly.  Advised on diet and exercise.    3. Elevated hemoglobin A1c- worse, advised on diet and exercise.      Return in about 6 months (around  3/8/2024) for Recheck.    Lucien Mathews PA-C  09/08/2023Answers submitted by the patient for this visit:  Primary Reason for Visit (Submitted on 9/8/2023)  What is the primary reason for your visit?: Other  Other (Submitted on 9/8/2023)  Please describe your symptoms.: Follow up on revised dosage  Have you had these symptoms before?: No  How long have you been having these symptoms?: 1-2 weeks  Please list any medications you are currently taking for this condition.: .....

## 2023-12-28 RX ORDER — ATORVASTATIN CALCIUM 20 MG/1
40 TABLET, FILM COATED ORAL NIGHTLY
Qty: 90 TABLET | Refills: 0 | Status: SHIPPED | OUTPATIENT
Start: 2023-12-28

## 2024-02-12 RX ORDER — ATORVASTATIN CALCIUM 20 MG/1
40 TABLET, FILM COATED ORAL NIGHTLY
Qty: 90 TABLET | Refills: 0 | Status: SHIPPED | OUTPATIENT
Start: 2024-02-12

## 2024-03-22 ENCOUNTER — OFFICE VISIT (OUTPATIENT)
Dept: INTERNAL MEDICINE | Facility: CLINIC | Age: 59
End: 2024-03-22
Payer: COMMERCIAL

## 2024-03-22 ENCOUNTER — LAB (OUTPATIENT)
Dept: INTERNAL MEDICINE | Facility: CLINIC | Age: 59
End: 2024-03-22
Payer: COMMERCIAL

## 2024-03-22 VITALS
DIASTOLIC BLOOD PRESSURE: 66 MMHG | WEIGHT: 172.2 LBS | SYSTOLIC BLOOD PRESSURE: 100 MMHG | BODY MASS INDEX: 27.03 KG/M2 | HEIGHT: 67 IN | TEMPERATURE: 96.9 F | OXYGEN SATURATION: 98 % | HEART RATE: 51 BPM

## 2024-03-22 DIAGNOSIS — Z00.00 HEALTH CARE MAINTENANCE: ICD-10-CM

## 2024-03-22 DIAGNOSIS — R73.09 ELEVATED HEMOGLOBIN A1C: ICD-10-CM

## 2024-03-22 DIAGNOSIS — E78.5 HYPERLIPIDEMIA, UNSPECIFIED HYPERLIPIDEMIA TYPE: ICD-10-CM

## 2024-03-22 DIAGNOSIS — I10 PRIMARY HYPERTENSION: Primary | ICD-10-CM

## 2024-03-22 LAB
ALBUMIN SERPL-MCNC: 4.5 G/DL (ref 3.5–5.2)
ALBUMIN/GLOB SERPL: 1.4 G/DL
ALP SERPL-CCNC: 59 U/L (ref 39–117)
ALT SERPL W P-5'-P-CCNC: 29 U/L (ref 1–41)
ANION GAP SERPL CALCULATED.3IONS-SCNC: 10.6 MMOL/L (ref 5–15)
AST SERPL-CCNC: 27 U/L (ref 1–40)
BILIRUB SERPL-MCNC: 0.6 MG/DL (ref 0–1.2)
BUN SERPL-MCNC: 14 MG/DL (ref 6–20)
BUN/CREAT SERPL: 15.2 (ref 7–25)
CALCIUM SPEC-SCNC: 10.2 MG/DL (ref 8.6–10.5)
CHLORIDE SERPL-SCNC: 104 MMOL/L (ref 98–107)
CHOLEST SERPL-MCNC: 111 MG/DL (ref 0–200)
CO2 SERPL-SCNC: 25.4 MMOL/L (ref 22–29)
CREAT SERPL-MCNC: 0.92 MG/DL (ref 0.76–1.27)
DEPRECATED RDW RBC AUTO: 43.8 FL (ref 37–54)
EGFRCR SERPLBLD CKD-EPI 2021: 96.4 ML/MIN/1.73
ERYTHROCYTE [DISTWIDTH] IN BLOOD BY AUTOMATED COUNT: 14.4 % (ref 12.3–15.4)
GLOBULIN UR ELPH-MCNC: 3.2 GM/DL
GLUCOSE SERPL-MCNC: 77 MG/DL (ref 65–99)
HCT VFR BLD AUTO: 45.7 % (ref 37.5–51)
HDLC SERPL-MCNC: 40 MG/DL (ref 40–60)
HGB BLD-MCNC: 15 G/DL (ref 13–17.7)
LDLC SERPL CALC-MCNC: 53 MG/DL (ref 0–100)
LDLC/HDLC SERPL: 1.32 {RATIO}
MCH RBC QN AUTO: 28 PG (ref 26.6–33)
MCHC RBC AUTO-ENTMCNC: 32.8 G/DL (ref 31.5–35.7)
MCV RBC AUTO: 85.3 FL (ref 79–97)
PLATELET # BLD AUTO: 186 10*3/MM3 (ref 140–450)
PMV BLD AUTO: 11.1 FL (ref 6–12)
POTASSIUM SERPL-SCNC: 4.6 MMOL/L (ref 3.5–5.2)
PROT SERPL-MCNC: 7.7 G/DL (ref 6–8.5)
RBC # BLD AUTO: 5.36 10*6/MM3 (ref 4.14–5.8)
SODIUM SERPL-SCNC: 140 MMOL/L (ref 136–145)
TRIGL SERPL-MCNC: 91 MG/DL (ref 0–150)
TSH SERPL DL<=0.05 MIU/L-ACNC: 1.32 UIU/ML (ref 0.27–4.2)
VLDLC SERPL-MCNC: 18 MG/DL (ref 5–40)
WBC NRBC COR # BLD AUTO: 6.04 10*3/MM3 (ref 3.4–10.8)

## 2024-03-22 PROCEDURE — 80061 LIPID PANEL: CPT | Performed by: PHYSICIAN ASSISTANT

## 2024-03-22 PROCEDURE — 36415 COLL VENOUS BLD VENIPUNCTURE: CPT | Performed by: PHYSICIAN ASSISTANT

## 2024-03-22 PROCEDURE — 80050 GENERAL HEALTH PANEL: CPT | Performed by: PHYSICIAN ASSISTANT

## 2024-03-22 PROCEDURE — 83036 HEMOGLOBIN GLYCOSYLATED A1C: CPT | Performed by: PHYSICIAN ASSISTANT

## 2024-03-22 RX ORDER — LISINOPRIL 10 MG/1
10 TABLET ORAL DAILY
Qty: 90 TABLET | Refills: 1 | Status: SHIPPED | OUTPATIENT
Start: 2024-03-22

## 2024-03-22 NOTE — PROGRESS NOTES
[facility]  [address]  [facility] [address]  Dept: [phone]  Dept Fax: [phone]  Loc: [phone]  Loc Fax: [phone]    Jai Lopez  1965    Follow Up Office Visit Note    History of Present Illness:  Patient is a 59-year-old male in today to follow-up for high blood pressure, hyperlipidemia, and elevated A1c.  Patient has lost 20 pounds and try to work on diet and exercise.  Patient tried to lower his A1c naturally.    On atorvastatin 20 mg nightly.  Needing FLP rechecked.    On lisinopril 20 mg daily.  Taking as directed with any problems or side effects.  Blood pressure been running lower recently.    Patient overall doing well and feeling well.        The following portions of the patient's history were reviewed and updated as appropriate: allergies, current medications, past family history, past medical history, past social history, past surgical history, and problem list.    Medications:    Current Outpatient Medications:     atorvastatin (LIPITOR) 20 MG tablet, TAKE 2 TABLETS BY MOUTH EVERY NIGHT, Disp: 90 tablet, Rfl: 0    lisinopril (PRINIVIL,ZESTRIL) 10 MG tablet, Take 1 tablet by mouth Daily., Disp: 90 tablet, Rfl: 1    Subjective  No Known Allergies     Past Medical History:   Diagnosis Date    High cholesterol     Hypertension        Past Surgical History:   Procedure Laterality Date    COLONOSCOPY  02/2021    SHOULDER SURGERY  2000       Family History   Problem Relation Age of Onset    Hyperlipidemia Mother     Hypertension Mother     Hyperlipidemia Brother     Hypertension Brother         Social History     Socioeconomic History    Marital status:    Tobacco Use    Smoking status: Never    Smokeless tobacco: Never   Vaping Use    Vaping status: Never Used   Substance and Sexual Activity    Alcohol use: Yes     Alcohol/week: 3.0 standard drinks of alcohol     Types: 2 Glasses of wine, 1 Cans of beer per week     "Drug use: Never    Sexual activity: Yes     Partners: Female     Birth control/protection: None       Review of Systems   Constitutional:  Negative for activity change, chills, fatigue, fever and unexpected weight change.   HENT:  Negative for congestion, ear pain, postnasal drip, sinus pressure and sore throat.    Eyes:  Negative for pain, discharge and redness.   Respiratory:  Negative for cough, shortness of breath and wheezing.    Cardiovascular:  Negative for chest pain, palpitations and leg swelling.   Gastrointestinal:  Negative for diarrhea, nausea and vomiting.   Endocrine: Negative for cold intolerance and heat intolerance.   Genitourinary:  Negative for decreased urine volume and dysuria.   Musculoskeletal:  Negative for arthralgias and myalgias.   Skin:  Negative for rash and wound.   Neurological:  Negative for dizziness, light-headedness and headaches.   Hematological:  Does not bruise/bleed easily.   Psychiatric/Behavioral:  Negative for confusion, dysphoric mood and sleep disturbance. The patient is not nervous/anxious.          Objective  Vitals:    03/22/24 0956   BP: 100/66   BP Location: Left arm   Patient Position: Sitting   Cuff Size: Adult   Pulse: 51   Temp: 96.9 °F (36.1 °C)   TempSrc: Temporal   SpO2: 98%   Weight: 78.1 kg (172 lb 3.2 oz)   Height: 170.2 cm (67.01\")     Body mass index is 26.96 kg/m².     Physical Exam  Physical Exam  Vitals and nursing note reviewed.   Constitutional:       General: He is not in acute distress.     Appearance: He is not ill-appearing.   HENT:      Head: Normocephalic.      Right Ear: Tympanic membrane, ear canal and external ear normal. There is no impacted cerumen.      Left Ear: Tympanic membrane, ear canal and external ear normal. There is no impacted cerumen.      Nose: No congestion or rhinorrhea.      Mouth/Throat:      Mouth: Mucous membranes are moist.      Pharynx: Oropharynx is clear. No oropharyngeal exudate or posterior oropharyngeal erythema. "   Eyes:      General:         Right eye: No discharge.         Left eye: No discharge.      Extraocular Movements: Extraocular movements intact.      Conjunctiva/sclera: Conjunctivae normal.      Pupils: Pupils are equal, round, and reactive to light.   Cardiovascular:      Rate and Rhythm: Normal rate and regular rhythm.      Heart sounds: Normal heart sounds. No murmur heard.     No friction rub. No gallop.   Pulmonary:      Effort: Pulmonary effort is normal. No respiratory distress.      Breath sounds: Normal breath sounds. No wheezing.   Abdominal:      General: Bowel sounds are normal. There is no distension.      Palpations: Abdomen is soft. There is no mass.      Tenderness: There is no abdominal tenderness.   Musculoskeletal:         General: No swelling. Normal range of motion.      Cervical back: Normal range of motion. No tenderness.      Right lower leg: No edema.      Left lower leg: No edema.   Lymphadenopathy:      Cervical: No cervical adenopathy.   Skin:     Findings: No bruising, erythema or rash.   Neurological:      Mental Status: He is oriented to person, place, and time.      Gait: Gait normal.   Psychiatric:         Mood and Affect: Mood normal.         Behavior: Behavior normal.         Thought Content: Thought content normal.         Judgment: Judgment normal.         Diagnostic Data  Procedures    Assessment  Diagnoses and all orders for this visit:    1. Primary hypertension (Primary)    2. Hyperlipidemia, unspecified hyperlipidemia type    3. Elevated hemoglobin A1c    4. Health care maintenance  -     CBC (No Diff)  -     Comprehensive Metabolic Panel  -     TSH Rfx On Abnormal To Free T4  -     Hemoglobin A1c; Future  -     Lipid Panel    Other orders  -     lisinopril (PRINIVIL,ZESTRIL) 10 MG tablet; Take 1 tablet by mouth Daily.  Dispense: 90 tablet; Refill: 1        Plan    1. Primary hypertension (Primary-) slightly hypotensive on lisinopril 20 mg daily.  Decrease lisinopril to 10  mg daily. Advised patient to take blood pressure readings at home 2-3 times daily. Advised if blood pressure higher than 160/100 or lower than 100/60 to call the office immediately. If symptomatic, go to the ER. Patient verbalized understanding of all instructions given and complied.    2. Hyperlipidemia, unspecified hyperlipidemia type- on atorvastatin 20 mg nightly.  Repeat FLP.    3. Elevated hemoglobin A1c- had been working on diet and exercise.  Repeat A1c.    4. Health care maintenance- ordered fasting labs.      Return in about 2 weeks (around 4/5/2024) for Recheck.    Lucien Mathews PA-C  03/22/2024  Answers submitted by the patient for this visit:  Primary Reason for Visit (Submitted on 3/20/2024)  What is the primary reason for your visit?: Other  Other (Submitted on 3/20/2024)  Please describe your symptoms.: Follow up for medication change and pre-diabetes.  Have you had these symptoms before?: Yes  How long have you been having these symptoms?: Greater than 2 weeks

## 2024-03-23 LAB — HBA1C MFR BLD: 6.2 % (ref 4.8–5.6)

## 2024-04-09 ENCOUNTER — OFFICE VISIT (OUTPATIENT)
Dept: INTERNAL MEDICINE | Facility: CLINIC | Age: 59
End: 2024-04-09
Payer: COMMERCIAL

## 2024-04-09 VITALS
BODY MASS INDEX: 27.78 KG/M2 | WEIGHT: 177 LBS | TEMPERATURE: 97.1 F | OXYGEN SATURATION: 98 % | HEIGHT: 67 IN | DIASTOLIC BLOOD PRESSURE: 74 MMHG | SYSTOLIC BLOOD PRESSURE: 102 MMHG | HEART RATE: 58 BPM

## 2024-04-09 DIAGNOSIS — R73.09 ELEVATED HEMOGLOBIN A1C: ICD-10-CM

## 2024-04-09 DIAGNOSIS — E78.5 HYPERLIPIDEMIA, UNSPECIFIED HYPERLIPIDEMIA TYPE: ICD-10-CM

## 2024-04-09 DIAGNOSIS — I10 PRIMARY HYPERTENSION: Primary | ICD-10-CM

## 2024-04-09 PROCEDURE — 99213 OFFICE O/P EST LOW 20 MIN: CPT | Performed by: PHYSICIAN ASSISTANT

## 2024-04-09 RX ORDER — LISINOPRIL 5 MG/1
5 TABLET ORAL DAILY
Qty: 30 TABLET | Refills: 1 | Status: SHIPPED | OUTPATIENT
Start: 2024-04-09

## 2024-04-09 NOTE — PROGRESS NOTES
[facility]  [address]  [facility] [address]  Dept: [phone]  Dept Fax: [phone]  Loc: [phone]  Loc Fax: [phone]    Jai Lopez  1965    Follow Up Office Visit Note    History of Present Illness:  Patient 59-year-old male in today to follow-up for hypertension.  Blood pressure remains low on lisinopril 10 mg daily.    Hypertension  Pertinent negatives include no chest pain, headaches, palpitations or shortness of breath.       The following portions of the patient's history were reviewed and updated as appropriate: allergies, current medications, past family history, past medical history, past social history, past surgical history, and problem list.    Medications:    Current Outpatient Medications:     atorvastatin (LIPITOR) 20 MG tablet, TAKE 2 TABLETS BY MOUTH EVERY NIGHT, Disp: 90 tablet, Rfl: 0    lisinopril (PRINIVIL,ZESTRIL) 5 MG tablet, Take 1 tablet by mouth Daily., Disp: 30 tablet, Rfl: 1    Subjective  No Known Allergies     Past Medical History:   Diagnosis Date    High cholesterol     Hypertension        Past Surgical History:   Procedure Laterality Date    COLONOSCOPY  02/2021    SHOULDER SURGERY  2000       Family History   Problem Relation Age of Onset    Hyperlipidemia Mother     Hypertension Mother     Hyperlipidemia Brother     Hypertension Brother         Social History     Socioeconomic History    Marital status:    Tobacco Use    Smoking status: Never    Smokeless tobacco: Never   Vaping Use    Vaping status: Never Used   Substance and Sexual Activity    Alcohol use: Yes     Alcohol/week: 3.0 standard drinks of alcohol     Types: 2 Glasses of wine, 1 Cans of beer per week    Drug use: Never    Sexual activity: Yes     Partners: Female     Birth control/protection: None       Review of Systems   Constitutional:  Negative for activity change, chills, fatigue, fever and unexpected weight change.   HENT:   "Negative for congestion, ear pain, postnasal drip, sinus pressure and sore throat.    Eyes:  Negative for pain, discharge and redness.   Respiratory:  Negative for cough, shortness of breath and wheezing.    Cardiovascular:  Negative for chest pain, palpitations and leg swelling.   Gastrointestinal:  Negative for diarrhea, nausea and vomiting.   Endocrine: Negative for cold intolerance and heat intolerance.   Genitourinary:  Negative for decreased urine volume and dysuria.   Musculoskeletal:  Negative for arthralgias and myalgias.   Skin:  Negative for rash and wound.   Neurological:  Negative for dizziness, light-headedness and headaches.   Hematological:  Does not bruise/bleed easily.   Psychiatric/Behavioral:  Negative for confusion, dysphoric mood and sleep disturbance. The patient is not nervous/anxious.          Objective  Vitals:    04/09/24 1002   BP: 102/74   BP Location: Right arm   Patient Position: Sitting   Cuff Size: Adult   Pulse: 58   Temp: 97.1 °F (36.2 °C)   TempSrc: Temporal   SpO2: 98%   Weight: 80.3 kg (177 lb)   Height: 170.2 cm (67.01\")     Body mass index is 27.72 kg/m².     Physical Exam  Physical Exam  Vitals and nursing note reviewed.   Constitutional:       General: He is not in acute distress.     Appearance: He is not ill-appearing.   HENT:      Head: Normocephalic.      Right Ear: Tympanic membrane, ear canal and external ear normal. There is no impacted cerumen.      Left Ear: Tympanic membrane, ear canal and external ear normal. There is no impacted cerumen.      Nose: No congestion or rhinorrhea.      Mouth/Throat:      Mouth: Mucous membranes are moist.      Pharynx: Oropharynx is clear. No oropharyngeal exudate or posterior oropharyngeal erythema.   Eyes:      General:         Right eye: No discharge.         Left eye: No discharge.      Extraocular Movements: Extraocular movements intact.      Conjunctiva/sclera: Conjunctivae normal.      Pupils: Pupils are equal, round, and " reactive to light.   Cardiovascular:      Rate and Rhythm: Normal rate and regular rhythm.      Heart sounds: Normal heart sounds. No murmur heard.     No friction rub. No gallop.   Pulmonary:      Effort: Pulmonary effort is normal. No respiratory distress.      Breath sounds: Normal breath sounds. No wheezing.   Abdominal:      General: Bowel sounds are normal. There is no distension.      Palpations: Abdomen is soft. There is no mass.      Tenderness: There is no abdominal tenderness.   Musculoskeletal:         General: No swelling. Normal range of motion.      Cervical back: Normal range of motion. No tenderness.      Right lower leg: No edema.      Left lower leg: No edema.   Lymphadenopathy:      Cervical: No cervical adenopathy.   Skin:     Findings: No bruising, erythema or rash.   Neurological:      Mental Status: He is oriented to person, place, and time.      Gait: Gait normal.   Psychiatric:         Mood and Affect: Mood normal.         Behavior: Behavior normal.         Thought Content: Thought content normal.         Judgment: Judgment normal.         Diagnostic Data  Procedures    Assessment  Diagnoses and all orders for this visit:    1. Primary hypertension (Primary)    2. Hyperlipidemia, unspecified hyperlipidemia type    3. Elevated hemoglobin A1c    Other orders  -     lisinopril (PRINIVIL,ZESTRIL) 5 MG tablet; Take 1 tablet by mouth Daily.  Dispense: 30 tablet; Refill: 1        Plan    1. Primary hypertension (Primary)- blood pressure continuing to drop.  Decrease lisinopril to 5 mg daily. Advised patient to take blood pressure readings at home 2-3 times daily. Advised if blood pressure higher than 160/100 or lower than 100/60 to call the office immediately. If symptomatic, go to the ER. Patient verbalized understanding of all instructions given and complied.    2. Hyperlipidemia, unspecified hyperlipidemia- type on atorvastatin.    3. Elevated hemoglobin A1c- A1c coming down.      Return in  about 4 weeks (around 5/7/2024) for Recheck.    Lucien Mathews PA-C  04/09/2024  Answers submitted by the patient for this visit:  Primary Reason for Visit (Submitted on 4/2/2024)  What is the primary reason for your visit?: Other  Other (Submitted on 4/2/2024)  Please describe your symptoms.: follow up  Have you had these symptoms before?: No  How long have you been having these symptoms?: 1-2 weeks  Please list any medications you are currently taking for this condition.: this is all N/A.  appointment is just a follow up after physical

## 2024-04-22 RX ORDER — ATORVASTATIN CALCIUM 20 MG/1
40 TABLET, FILM COATED ORAL NIGHTLY
Qty: 90 TABLET | Refills: 0 | Status: SHIPPED | OUTPATIENT
Start: 2024-04-22

## 2024-05-09 ENCOUNTER — OFFICE VISIT (OUTPATIENT)
Dept: INTERNAL MEDICINE | Facility: CLINIC | Age: 59
End: 2024-05-09
Payer: COMMERCIAL

## 2024-05-09 VITALS
OXYGEN SATURATION: 96 % | WEIGHT: 178.8 LBS | HEIGHT: 67 IN | TEMPERATURE: 96.9 F | HEART RATE: 62 BPM | BODY MASS INDEX: 28.06 KG/M2 | DIASTOLIC BLOOD PRESSURE: 68 MMHG | SYSTOLIC BLOOD PRESSURE: 106 MMHG

## 2024-05-09 DIAGNOSIS — E78.5 HYPERLIPIDEMIA, UNSPECIFIED HYPERLIPIDEMIA TYPE: ICD-10-CM

## 2024-05-09 DIAGNOSIS — H61.22 IMPACTED CERUMEN OF LEFT EAR: ICD-10-CM

## 2024-05-09 DIAGNOSIS — I10 PRIMARY HYPERTENSION: Primary | ICD-10-CM

## 2024-05-09 PROCEDURE — 69209 REMOVE IMPACTED EAR WAX UNI: CPT | Performed by: PHYSICIAN ASSISTANT

## 2024-05-09 PROCEDURE — 99214 OFFICE O/P EST MOD 30 MIN: CPT | Performed by: PHYSICIAN ASSISTANT

## 2024-05-09 RX ORDER — LISINOPRIL 2.5 MG/1
2.5 TABLET ORAL DAILY
Qty: 30 TABLET | Refills: 1 | Status: SHIPPED | OUTPATIENT
Start: 2024-05-09

## 2024-05-20 RX ORDER — ATORVASTATIN CALCIUM 40 MG/1
40 TABLET, FILM COATED ORAL EVERY EVENING
Qty: 30 TABLET | Refills: 1 | Status: SHIPPED | OUTPATIENT
Start: 2024-05-20

## 2024-06-17 ENCOUNTER — OFFICE VISIT (OUTPATIENT)
Dept: INTERNAL MEDICINE | Facility: CLINIC | Age: 59
End: 2024-06-17
Payer: COMMERCIAL

## 2024-06-17 VITALS
BODY MASS INDEX: 28.53 KG/M2 | SYSTOLIC BLOOD PRESSURE: 120 MMHG | OXYGEN SATURATION: 97 % | TEMPERATURE: 97.1 F | DIASTOLIC BLOOD PRESSURE: 82 MMHG | WEIGHT: 181.8 LBS | HEIGHT: 67 IN | HEART RATE: 66 BPM

## 2024-06-17 DIAGNOSIS — I10 PRIMARY HYPERTENSION: Primary | ICD-10-CM

## 2024-06-17 DIAGNOSIS — Z00.00 HEALTH CARE MAINTENANCE: ICD-10-CM

## 2024-06-17 DIAGNOSIS — E78.5 HYPERLIPIDEMIA, UNSPECIFIED HYPERLIPIDEMIA TYPE: ICD-10-CM

## 2024-06-17 PROCEDURE — 99214 OFFICE O/P EST MOD 30 MIN: CPT | Performed by: PHYSICIAN ASSISTANT

## 2024-06-17 NOTE — PROGRESS NOTES
[facility]  [address]  [facility] [address]  Dept: [phone]  Dept Fax: [phone]  Loc: [phone]  Loc Fax: [phone]    Jai Lopez  1965    Follow Up Office Visit Note    History of Present Illness:  Patient a 59-year-old male in today to follow-up for high blood pressure.  Was on lisinopril 5 mg daily and reduced to 2.5 mg daily.  Taking as directed without problems or side effects.  Blood pressure running normal now.  Overall doing well and feeling well.    Hypertension  Pertinent negatives include no chest pain, headaches, palpitations or shortness of breath.       The following portions of the patient's history were reviewed and updated as appropriate: allergies, current medications, past family history, past medical history, past social history, past surgical history, and problem list.    Medications:    Current Outpatient Medications:     atorvastatin (LIPITOR) 40 MG tablet, TAKE 1 TABLET BY MOUTH EVERY EVENING, Disp: 30 tablet, Rfl: 1    lisinopril (PRINIVIL,ZESTRIL) 2.5 MG tablet, Take 1 tablet by mouth Daily., Disp: 30 tablet, Rfl: 1    Subjective  No Known Allergies     Past Medical History:   Diagnosis Date    High cholesterol     Hypertension        Past Surgical History:   Procedure Laterality Date    COLONOSCOPY  02/2021    SHOULDER SURGERY  2000       Family History   Problem Relation Age of Onset    Hyperlipidemia Mother     Hypertension Mother     Hyperlipidemia Brother     Hypertension Brother         Social History     Socioeconomic History    Marital status:    Tobacco Use    Smoking status: Never    Smokeless tobacco: Never   Vaping Use    Vaping status: Never Used   Substance and Sexual Activity    Alcohol use: Yes     Alcohol/week: 3.0 standard drinks of alcohol     Types: 2 Glasses of wine, 1 Cans of beer per week    Drug use: Never    Sexual activity: Yes     Partners: Female     Birth  "control/protection: None       Review of Systems   Constitutional:  Negative for activity change, chills, fatigue, fever and unexpected weight change.   HENT:  Negative for congestion, ear pain, postnasal drip, sinus pressure and sore throat.    Eyes:  Negative for pain, discharge and redness.   Respiratory:  Negative for cough, shortness of breath and wheezing.    Cardiovascular:  Negative for chest pain, palpitations and leg swelling.   Gastrointestinal:  Negative for diarrhea, nausea and vomiting.   Endocrine: Negative for cold intolerance and heat intolerance.   Genitourinary:  Negative for decreased urine volume and dysuria.   Musculoskeletal:  Negative for arthralgias and myalgias.   Skin:  Negative for rash and wound.   Neurological:  Negative for dizziness, light-headedness and headaches.   Hematological:  Does not bruise/bleed easily.   Psychiatric/Behavioral:  Negative for confusion, dysphoric mood and sleep disturbance. The patient is not nervous/anxious.          Objective  Vitals:    06/17/24 0816   BP: 120/82   BP Location: Left arm   Patient Position: Sitting   Cuff Size: Large Adult   Pulse: 66   Temp: 97.1 °F (36.2 °C)   TempSrc: Temporal   SpO2: 97%   Weight: 82.5 kg (181 lb 12.8 oz)   Height: 170.2 cm (67.01\")     Body mass index is 28.47 kg/m².     Physical Exam  Physical Exam  Vitals and nursing note reviewed.   Constitutional:       General: He is not in acute distress.     Appearance: He is not ill-appearing.   HENT:      Head: Normocephalic.      Right Ear: Tympanic membrane, ear canal and external ear normal. There is no impacted cerumen.      Left Ear: Tympanic membrane, ear canal and external ear normal. There is no impacted cerumen.      Nose: No congestion or rhinorrhea.      Mouth/Throat:      Mouth: Mucous membranes are moist.      Pharynx: Oropharynx is clear. No oropharyngeal exudate or posterior oropharyngeal erythema.   Eyes:      General:         Right eye: No discharge.         " Left eye: No discharge.      Extraocular Movements: Extraocular movements intact.      Conjunctiva/sclera: Conjunctivae normal.      Pupils: Pupils are equal, round, and reactive to light.   Cardiovascular:      Rate and Rhythm: Normal rate and regular rhythm.      Heart sounds: Normal heart sounds. No murmur heard.     No friction rub. No gallop.   Pulmonary:      Effort: Pulmonary effort is normal. No respiratory distress.      Breath sounds: Normal breath sounds. No wheezing.   Abdominal:      General: Bowel sounds are normal. There is no distension.      Palpations: Abdomen is soft. There is no mass.      Tenderness: There is no abdominal tenderness.   Musculoskeletal:         General: No swelling. Normal range of motion.      Cervical back: Normal range of motion. No tenderness.      Right lower leg: No edema.      Left lower leg: No edema.   Lymphadenopathy:      Cervical: No cervical adenopathy.   Skin:     Findings: No bruising, erythema or rash.   Neurological:      Mental Status: He is oriented to person, place, and time.      Gait: Gait normal.   Psychiatric:         Mood and Affect: Mood normal.         Behavior: Behavior normal.         Thought Content: Thought content normal.         Judgment: Judgment normal.         Diagnostic Data  Procedures    Assessment  Diagnoses and all orders for this visit:    1. Primary hypertension (Primary)    2. Hyperlipidemia, unspecified hyperlipidemia type    3. Health care maintenance  -     CBC (No Diff)  -     Comprehensive Metabolic Panel  -     TSH Rfx On Abnormal To Free T4  -     Hemoglobin A1c; Future  -     Lipid Panel        Plan    1. Primary hypertension (Primary)- well-controlled on lisinopril 2.5 mg daily.  Keep same.  Continue to monitor.    2. Hyperlipidemia, unspecified hyperlipidemia type- on atorvastatin.    3. Health care maintenance- obtain fasting labs.      Return in about 3 months (around 9/17/2024) for Recheck.    Lucien Mathews,  YRN  06/17/2024  Answers submitted by the patient for this visit:  Primary Reason for Visit (Submitted on 6/17/2024)  What is the primary reason for your visit?: Other  Other (Submitted on 6/17/2024)  Please describe your symptoms.: Follow up on blood pressure dosage  Have you had these symptoms before?: Yes  How long have you been having these symptoms?: Greater than 2 weeks

## 2024-06-25 RX ORDER — LISINOPRIL 2.5 MG/1
2.5 TABLET ORAL DAILY
Qty: 30 TABLET | Refills: 1 | Status: SHIPPED | OUTPATIENT
Start: 2024-06-25

## 2024-07-23 RX ORDER — ATORVASTATIN CALCIUM 40 MG/1
40 TABLET, FILM COATED ORAL EVERY EVENING
Qty: 30 TABLET | Refills: 1 | Status: SHIPPED | OUTPATIENT
Start: 2024-07-23

## 2024-08-19 RX ORDER — LISINOPRIL 2.5 MG/1
2.5 TABLET ORAL DAILY
Qty: 30 TABLET | Refills: 1 | Status: SHIPPED | OUTPATIENT
Start: 2024-08-19

## 2024-09-16 RX ORDER — ATORVASTATIN CALCIUM 40 MG/1
40 TABLET, FILM COATED ORAL EVERY EVENING
Qty: 30 TABLET | Refills: 1 | Status: SHIPPED | OUTPATIENT
Start: 2024-09-16

## 2024-10-18 RX ORDER — LISINOPRIL 2.5 MG/1
2.5 TABLET ORAL DAILY
Qty: 30 TABLET | Refills: 1 | Status: SHIPPED | OUTPATIENT
Start: 2024-10-18

## 2024-10-21 RX ORDER — ATORVASTATIN CALCIUM 40 MG/1
40 TABLET, FILM COATED ORAL EVERY EVENING
Qty: 30 TABLET | Refills: 1 | Status: SHIPPED | OUTPATIENT
Start: 2024-10-21

## 2024-12-18 RX ORDER — LISINOPRIL 2.5 MG/1
2.5 TABLET ORAL DAILY
Qty: 30 TABLET | Refills: 1 | Status: SHIPPED | OUTPATIENT
Start: 2024-12-18

## 2025-01-13 RX ORDER — ATORVASTATIN CALCIUM 40 MG/1
40 TABLET, FILM COATED ORAL EVERY EVENING
Qty: 30 TABLET | Refills: 1 | Status: SHIPPED | OUTPATIENT
Start: 2025-01-13

## 2025-02-06 ENCOUNTER — OFFICE VISIT (OUTPATIENT)
Dept: INTERNAL MEDICINE | Facility: CLINIC | Age: 60
End: 2025-02-06
Payer: COMMERCIAL

## 2025-02-06 ENCOUNTER — LAB (OUTPATIENT)
Dept: INTERNAL MEDICINE | Facility: CLINIC | Age: 60
End: 2025-02-06
Payer: COMMERCIAL

## 2025-02-06 VITALS
BODY MASS INDEX: 27.53 KG/M2 | OXYGEN SATURATION: 96 % | TEMPERATURE: 98.4 F | DIASTOLIC BLOOD PRESSURE: 70 MMHG | HEIGHT: 67 IN | WEIGHT: 175.4 LBS | SYSTOLIC BLOOD PRESSURE: 110 MMHG | HEART RATE: 85 BPM

## 2025-02-06 DIAGNOSIS — Z12.5 SCREENING PSA (PROSTATE SPECIFIC ANTIGEN): ICD-10-CM

## 2025-02-06 DIAGNOSIS — Z00.00 ANNUAL PHYSICAL EXAM: ICD-10-CM

## 2025-02-06 DIAGNOSIS — Z00.00 ANNUAL PHYSICAL EXAM: Primary | ICD-10-CM

## 2025-02-06 LAB
25(OH)D3 SERPL-MCNC: 18 NG/ML (ref 30–100)
ALBUMIN SERPL-MCNC: 4.3 G/DL (ref 3.5–5.2)
ALBUMIN/GLOB SERPL: 1.3 G/DL
ALP SERPL-CCNC: 52 U/L (ref 39–117)
ALT SERPL W P-5'-P-CCNC: 32 U/L (ref 1–41)
ANION GAP SERPL CALCULATED.3IONS-SCNC: 13.3 MMOL/L (ref 5–15)
AST SERPL-CCNC: 27 U/L (ref 1–40)
BILIRUB SERPL-MCNC: 0.4 MG/DL (ref 0–1.2)
BUN SERPL-MCNC: 14 MG/DL (ref 6–20)
BUN/CREAT SERPL: 11.1 (ref 7–25)
CALCIUM SPEC-SCNC: 9.6 MG/DL (ref 8.6–10.5)
CHLORIDE SERPL-SCNC: 102 MMOL/L (ref 98–107)
CHOLEST SERPL-MCNC: 123 MG/DL (ref 0–200)
CO2 SERPL-SCNC: 22.7 MMOL/L (ref 22–29)
CREAT SERPL-MCNC: 1.26 MG/DL (ref 0.76–1.27)
DEPRECATED RDW RBC AUTO: 44 FL (ref 37–54)
EGFRCR SERPLBLD CKD-EPI 2021: 65.7 ML/MIN/1.73
ERYTHROCYTE [DISTWIDTH] IN BLOOD BY AUTOMATED COUNT: 14.1 % (ref 12.3–15.4)
FOLATE SERPL-MCNC: 11.8 NG/ML (ref 4.78–24.2)
GLOBULIN UR ELPH-MCNC: 3.4 GM/DL
GLUCOSE SERPL-MCNC: 88 MG/DL (ref 65–99)
HBA1C MFR BLD: 6.2 % (ref 4.8–5.6)
HCT VFR BLD AUTO: 44 % (ref 37.5–51)
HDLC SERPL-MCNC: 43 MG/DL (ref 40–60)
HGB BLD-MCNC: 14.1 G/DL (ref 13–17.7)
LDLC SERPL CALC-MCNC: 66 MG/DL (ref 0–100)
LDLC/HDLC SERPL: 1.54 {RATIO}
MCH RBC QN AUTO: 27.4 PG (ref 26.6–33)
MCHC RBC AUTO-ENTMCNC: 32 G/DL (ref 31.5–35.7)
MCV RBC AUTO: 85.6 FL (ref 79–97)
PLATELET # BLD AUTO: 159 10*3/MM3 (ref 140–450)
PMV BLD AUTO: 11.2 FL (ref 6–12)
POTASSIUM SERPL-SCNC: 3.8 MMOL/L (ref 3.5–5.2)
PROT SERPL-MCNC: 7.7 G/DL (ref 6–8.5)
PSA SERPL-MCNC: 2.04 NG/ML (ref 0–4)
RBC # BLD AUTO: 5.14 10*6/MM3 (ref 4.14–5.8)
SODIUM SERPL-SCNC: 138 MMOL/L (ref 136–145)
TRIGL SERPL-MCNC: 68 MG/DL (ref 0–150)
TSH SERPL DL<=0.05 MIU/L-ACNC: 0.42 UIU/ML (ref 0.27–4.2)
VIT B12 BLD-MCNC: 338 PG/ML (ref 211–946)
VLDLC SERPL-MCNC: 14 MG/DL (ref 5–40)
WBC NRBC COR # BLD AUTO: 6.35 10*3/MM3 (ref 3.4–10.8)

## 2025-02-06 PROCEDURE — 80061 LIPID PANEL: CPT | Performed by: PHYSICIAN ASSISTANT

## 2025-02-06 PROCEDURE — 83036 HEMOGLOBIN GLYCOSYLATED A1C: CPT | Performed by: PHYSICIAN ASSISTANT

## 2025-02-06 PROCEDURE — G0103 PSA SCREENING: HCPCS | Performed by: PHYSICIAN ASSISTANT

## 2025-02-06 PROCEDURE — 36415 COLL VENOUS BLD VENIPUNCTURE: CPT | Performed by: PHYSICIAN ASSISTANT

## 2025-02-06 PROCEDURE — 82306 VITAMIN D 25 HYDROXY: CPT | Performed by: PHYSICIAN ASSISTANT

## 2025-02-06 PROCEDURE — 82746 ASSAY OF FOLIC ACID SERUM: CPT | Performed by: PHYSICIAN ASSISTANT

## 2025-02-06 PROCEDURE — 82607 VITAMIN B-12: CPT | Performed by: PHYSICIAN ASSISTANT

## 2025-02-06 PROCEDURE — 99396 PREV VISIT EST AGE 40-64: CPT | Performed by: PHYSICIAN ASSISTANT

## 2025-02-06 PROCEDURE — 80050 GENERAL HEALTH PANEL: CPT | Performed by: PHYSICIAN ASSISTANT

## 2025-02-06 NOTE — PROGRESS NOTES
[facility]  [address]  [facility] [address]  Dept: [phone]  Dept Fax: [phone]  Loc: [phone]  Loc Fax: [phone]    Jai Lopez  1965    Follow Up Office Visit Note    History of Present Illness:  Pt is a 59 y/o male in today for annual physical. Overall doing well and feeling well.    Hyperlipidemia  Pertinent negatives include no chest pain, myalgias or shortness of breath.   Hypertension  This is a recurrent problem. The current episode started more than 1 year ago. The problem is unchanged. Pertinent negatives include no anxiety, blurred vision, chest pain, headaches, malaise/fatigue, orthopnea, palpitations, peripheral edema or shortness of breath. There are no associated agents to hypertension. There are no compliance problems.    Additional comments: Just a follow up to see how i am doing for blood pressure, cholesterol and diabetes.      The following portions of the patient's history were reviewed and updated as appropriate: allergies, current medications, past family history, past medical history, past social history, past surgical history, and problem list.    Medications:    Current Outpatient Medications:     atorvastatin (LIPITOR) 40 MG tablet, TAKE 1 TABLET BY MOUTH EVERY EVENING, Disp: 30 tablet, Rfl: 1    lisinopril (PRINIVIL,ZESTRIL) 2.5 MG tablet, TAKE 1 TABLET BY MOUTH EVERY DAY, Disp: 30 tablet, Rfl: 1    Subjective  No Known Allergies     Past Medical History:   Diagnosis Date    High cholesterol     Hypertension        Past Surgical History:   Procedure Laterality Date    COLONOSCOPY  02/2021    SHOULDER SURGERY  2000       Family History   Problem Relation Age of Onset    Hyperlipidemia Mother     Hypertension Mother     Hyperlipidemia Brother     Hypertension Brother         Social History     Socioeconomic History    Marital status:    Tobacco Use    Smoking status: Never    Smokeless tobacco: Never  "  Vaping Use    Vaping status: Never Used   Substance and Sexual Activity    Alcohol use: Yes     Alcohol/week: 1.0 standard drink of alcohol     Types: 1 Glasses of wine per week    Drug use: Never    Sexual activity: Yes     Partners: Female     Birth control/protection: None       Review of Systems   Constitutional:  Negative for activity change, chills, fatigue, fever, malaise/fatigue and unexpected weight change.   HENT:  Negative for congestion, ear pain, postnasal drip, sinus pressure and sore throat.    Eyes:  Negative for blurred vision, pain, discharge and redness.   Respiratory:  Negative for cough, shortness of breath and wheezing.    Cardiovascular:  Negative for chest pain, palpitations, orthopnea and leg swelling.   Gastrointestinal:  Negative for diarrhea, nausea and vomiting.   Endocrine: Negative for cold intolerance and heat intolerance.   Genitourinary:  Negative for decreased urine volume and dysuria.   Musculoskeletal:  Negative for arthralgias and myalgias.   Skin:  Negative for rash and wound.   Neurological:  Negative for dizziness, light-headedness and headaches.   Hematological:  Does not bruise/bleed easily.   Psychiatric/Behavioral:  Negative for confusion, dysphoric mood and sleep disturbance. The patient is not nervous/anxious.          Objective  Vitals:    02/06/25 0823   BP: 110/70   BP Location: Left arm   Patient Position: Sitting   Cuff Size: Adult   Pulse: 85   Temp: 98.4 °F (36.9 °C)   TempSrc: Temporal   SpO2: 96%   Weight: 79.6 kg (175 lb 6.4 oz)   Height: 170.2 cm (67.01\")     Body mass index is 27.46 kg/m².     Physical Exam  Physical Exam  Vitals and nursing note reviewed.   Constitutional:       General: He is not in acute distress.     Appearance: He is not ill-appearing.   HENT:      Head: Normocephalic.      Right Ear: Tympanic membrane, ear canal and external ear normal. There is no impacted cerumen.      Left Ear: Tympanic membrane, ear canal and external ear normal. " There is no impacted cerumen.      Nose: No congestion or rhinorrhea.      Mouth/Throat:      Mouth: Mucous membranes are moist.      Pharynx: Oropharynx is clear. No oropharyngeal exudate or posterior oropharyngeal erythema.   Eyes:      General:         Right eye: No discharge.         Left eye: No discharge.      Extraocular Movements: Extraocular movements intact.      Conjunctiva/sclera: Conjunctivae normal.      Pupils: Pupils are equal, round, and reactive to light.   Cardiovascular:      Rate and Rhythm: Normal rate and regular rhythm.      Heart sounds: Normal heart sounds. No murmur heard.     No friction rub. No gallop.   Pulmonary:      Effort: Pulmonary effort is normal. No respiratory distress.      Breath sounds: Normal breath sounds. No wheezing.   Abdominal:      General: Bowel sounds are normal. There is no distension.      Palpations: Abdomen is soft. There is no mass.      Tenderness: There is no abdominal tenderness.   Musculoskeletal:         General: No swelling. Normal range of motion.      Cervical back: Normal range of motion. No tenderness.      Right lower leg: No edema.      Left lower leg: No edema.   Lymphadenopathy:      Cervical: No cervical adenopathy.   Skin:     Findings: No bruising, erythema or rash.   Neurological:      Mental Status: He is oriented to person, place, and time.      Gait: Gait normal.   Psychiatric:         Mood and Affect: Mood normal.         Behavior: Behavior normal.         Thought Content: Thought content normal.         Judgment: Judgment normal.         Diagnostic Data  Procedures    Assessment  Diagnoses and all orders for this visit:    1. Annual physical exam (Primary)  -     Vitamin B12 & Folate  -     Vitamin D,25-Hydroxy; Future  -     Lipid Panel  -     Hemoglobin A1c; Future  -     TSH Rfx On Abnormal To Free T4  -     Comprehensive Metabolic Panel  -     CBC (No Diff)    2. Screening PSA (prostate specific antigen)  -     PSA SCREENING;  Future        Plan    1. Annual physical exam (Primary)- ordered fasting labs and follow up with these. Advised on nutrition and exercise and follow up with this.    2. Screening PSA (prostate specific antigen)- ordered PSA.      Return in about 6 months (around 8/6/2025).    Lucien Mathews PA-C  02/06/2025

## 2025-02-10 RX ORDER — CHOLECALCIFEROL (VITAMIN D3) 50 MCG
2000 TABLET ORAL DAILY
Qty: 90 TABLET | Refills: 1 | Status: SHIPPED | OUTPATIENT
Start: 2025-02-10

## 2025-02-10 RX ORDER — LANOLIN ALCOHOL/MO/W.PET/CERES
1000 CREAM (GRAM) TOPICAL DAILY
Qty: 90 TABLET | Refills: 1 | Status: SHIPPED | OUTPATIENT
Start: 2025-02-10

## 2025-02-13 RX ORDER — LISINOPRIL 2.5 MG/1
2.5 TABLET ORAL DAILY
Qty: 30 TABLET | Refills: 1 | Status: SHIPPED | OUTPATIENT
Start: 2025-02-13

## 2025-03-11 RX ORDER — ATORVASTATIN CALCIUM 40 MG/1
40 TABLET, FILM COATED ORAL EVERY EVENING
Qty: 30 TABLET | Refills: 1 | Status: SHIPPED | OUTPATIENT
Start: 2025-03-11

## 2025-04-16 RX ORDER — LISINOPRIL 2.5 MG/1
2.5 TABLET ORAL DAILY
Qty: 30 TABLET | Refills: 1 | Status: SHIPPED | OUTPATIENT
Start: 2025-04-16

## 2025-06-02 RX ORDER — ATORVASTATIN CALCIUM 40 MG/1
40 TABLET, FILM COATED ORAL EVERY EVENING
Qty: 30 TABLET | Refills: 1 | Status: SHIPPED | OUTPATIENT
Start: 2025-06-02

## 2025-06-25 RX ORDER — LISINOPRIL 2.5 MG/1
2.5 TABLET ORAL DAILY
Qty: 30 TABLET | Refills: 1 | Status: SHIPPED | OUTPATIENT
Start: 2025-06-25

## 2025-06-25 RX ORDER — ATORVASTATIN CALCIUM 40 MG/1
40 TABLET, FILM COATED ORAL EVERY EVENING
Qty: 30 TABLET | Refills: 1 | Status: SHIPPED | OUTPATIENT
Start: 2025-06-25